# Patient Record
Sex: FEMALE | Employment: OTHER | URBAN - METROPOLITAN AREA
[De-identification: names, ages, dates, MRNs, and addresses within clinical notes are randomized per-mention and may not be internally consistent; named-entity substitution may affect disease eponyms.]

---

## 2018-05-31 ENCOUNTER — ED HISTORICAL/CONVERTED ENCOUNTER (OUTPATIENT)
Dept: OTHER | Age: 53
End: 2018-05-31

## 2024-03-11 SDOH — HEALTH STABILITY: PHYSICAL HEALTH: ON AVERAGE, HOW MANY MINUTES DO YOU ENGAGE IN EXERCISE AT THIS LEVEL?: 40 MIN

## 2024-03-12 ENCOUNTER — HOSPITAL ENCOUNTER (OUTPATIENT)
Facility: HOSPITAL | Age: 59
Discharge: HOME | End: 2024-03-15

## 2024-03-12 ENCOUNTER — OFFICE VISIT (OUTPATIENT)
Age: 59
End: 2024-03-12
Payer: OTHER GOVERNMENT

## 2024-03-12 VITALS — HEIGHT: 62 IN | BODY MASS INDEX: 36.25 KG/M2 | WEIGHT: 197 LBS | RESPIRATION RATE: 14 BRPM

## 2024-03-12 DIAGNOSIS — M99.03 SOMATIC DYSFUNCTION OF LUMBAR REGION: ICD-10-CM

## 2024-03-12 DIAGNOSIS — M99.05 PELVIC REGION SOMATIC DYSFUNCTION: ICD-10-CM

## 2024-03-12 DIAGNOSIS — M51.36 DDD (DEGENERATIVE DISC DISEASE), LUMBAR: ICD-10-CM

## 2024-03-12 DIAGNOSIS — M99.04 SACRAL REGION SOMATIC DYSFUNCTION: ICD-10-CM

## 2024-03-12 DIAGNOSIS — M99.09 SOMATIC DYSFUNCTION OF ABDOMINAL REGION: ICD-10-CM

## 2024-03-12 DIAGNOSIS — M50.30 DDD (DEGENERATIVE DISC DISEASE), CERVICAL: ICD-10-CM

## 2024-03-12 DIAGNOSIS — M99.01 CERVICAL SOMATIC DYSFUNCTION: ICD-10-CM

## 2024-03-12 DIAGNOSIS — M99.06 LOWER LIMB REGION SOMATIC DYSFUNCTION: ICD-10-CM

## 2024-03-12 DIAGNOSIS — M51.36 DDD (DEGENERATIVE DISC DISEASE), LUMBAR: Primary | ICD-10-CM

## 2024-03-12 DIAGNOSIS — M99.08 RIB CAGE REGION SOMATIC DYSFUNCTION: ICD-10-CM

## 2024-03-12 DIAGNOSIS — M99.07 UPPER EXTREMITY SOMATIC DYSFUNCTION: ICD-10-CM

## 2024-03-12 DIAGNOSIS — M99.02 THORACIC REGION SOMATIC DYSFUNCTION: ICD-10-CM

## 2024-03-12 PROCEDURE — 99204 OFFICE O/P NEW MOD 45 MIN: CPT | Performed by: FAMILY MEDICINE

## 2024-03-12 PROCEDURE — 98929 OSTEOPATH MANJ 9-10 REGIONS: CPT | Performed by: FAMILY MEDICINE

## 2024-03-12 NOTE — PROGRESS NOTES
Lumbar facet injections was done by Dr Tapia in 2021, with some relief but the pain has returned  
SPINE (2-3 VIEWS)    Amb External Referral To Physical Therapy    diclofenac (VOLTAREN) 50 MG EC tablet      2. DDD (degenerative disc disease), cervical  XR CERVICAL SPINE (2-3 VIEWS)    Amb External Referral To Physical Therapy    diclofenac (VOLTAREN) 50 MG EC tablet      3. Somatic dysfunction of lumbar region  TN OSTEOPATHIC MANIPULATIVE TX 9-10 BODY REGIONS      4. Pelvic region somatic dysfunction  TN OSTEOPATHIC MANIPULATIVE TX 9-10 BODY REGIONS      5. Sacral region somatic dysfunction  TN OSTEOPATHIC MANIPULATIVE TX 9-10 BODY REGIONS      6. Thoracic region somatic dysfunction  TN OSTEOPATHIC MANIPULATIVE TX 9-10 BODY REGIONS      7. Rib cage region somatic dysfunction  TN OSTEOPATHIC MANIPULATIVE TX 9-10 BODY REGIONS      8. Cervical somatic dysfunction  TN OSTEOPATHIC MANIPULATIVE TX 9-10 BODY REGIONS      9. Lower limb region somatic dysfunction  TN OSTEOPATHIC MANIPULATIVE TX 9-10 BODY REGIONS      10. Upper extremity somatic dysfunction  TN OSTEOPATHIC MANIPULATIVE TX 9-10 BODY REGIONS      11. Somatic dysfunction of abdominal region  TN OSTEOPATHIC MANIPULATIVE TX 9-10 BODY REGIONS        Patient (or guardian if minor) verbalizes understanding of evaluation and plan.    Verbal consent obtained.  Cervical, Thoracic, Rib, Lumbar, Pelvic, Sacral, Upper Ext, Lower Ext, and Abdominal SD treated with Myofascial and ME.  Correction of previous malalignments verified after Tx.    Pt tolerated well.  Notes improvement of Sx and pain is now rated 0/10.    HEP/stretches daily. Discussed stretching/strengthening/posture.    Will start HEP, PT, and Rx for Voltaren 50mg as above and plan follow-up 6 weeks.

## 2024-03-13 ENCOUNTER — TELEPHONE (OUTPATIENT)
Age: 59
End: 2024-03-13

## 2024-03-13 NOTE — TELEPHONE ENCOUNTER
[facility] call from the [facility] today regarding 3/12/24 visit - original referral from August was scheduled with Spine and they needed to confirm patients appointment information.  Patient has been scheduled for a follow up on 5/7/24 and will need authorization for this visit.  Please submit a request for additional visits to [facility] to extend authorization.

## 2024-03-21 ENCOUNTER — TELEPHONE (OUTPATIENT)
Age: 59
End: 2024-03-21

## 2024-03-21 NOTE — TELEPHONE ENCOUNTER
Patient called today after speaking to the [facility] and is asking for a call back to discuss everything they talked about before we submit anything further on her behalf.    Tel. [phone]

## 2024-04-29 ENCOUNTER — TELEPHONE (OUTPATIENT)
Age: 59
End: 2024-04-29

## 2024-04-29 NOTE — TELEPHONE ENCOUNTER
Patient called today to ask if it would be possible to focus on the cervical/upper back pain she is having at her upcoming appt. States she is noticing tingling and numbness that goes down her arms. She is asking if [facility] will treat for it or if he will need to refer her to someone else, especially since she is a [facility] patient and her auth is for Low back pain. She is asking if we need to request for additional service or if she needs to reach out herself, or if he can see her for her upper back/neck.    Please review and advise, [phone]

## 2024-04-30 NOTE — TELEPHONE ENCOUNTER
Contacted pt at her home number discussed her concerns.  Informed her that we would send in the request for additional services for her neck as well as her low back

## 2024-05-07 ENCOUNTER — OFFICE VISIT (OUTPATIENT)
Age: 59
End: 2024-05-07
Payer: OTHER GOVERNMENT

## 2024-05-07 VITALS — WEIGHT: 200 LBS | HEIGHT: 62 IN | BODY MASS INDEX: 36.8 KG/M2

## 2024-05-07 DIAGNOSIS — M99.06 LOWER LIMB REGION SOMATIC DYSFUNCTION: ICD-10-CM

## 2024-05-07 DIAGNOSIS — M99.09 SOMATIC DYSFUNCTION OF ABDOMINAL REGION: ICD-10-CM

## 2024-05-07 DIAGNOSIS — M51.36 DDD (DEGENERATIVE DISC DISEASE), LUMBAR: ICD-10-CM

## 2024-05-07 DIAGNOSIS — M99.02 THORACIC REGION SOMATIC DYSFUNCTION: ICD-10-CM

## 2024-05-07 DIAGNOSIS — M99.01 CERVICAL SOMATIC DYSFUNCTION: ICD-10-CM

## 2024-05-07 DIAGNOSIS — M99.03 SOMATIC DYSFUNCTION OF LUMBAR REGION: ICD-10-CM

## 2024-05-07 DIAGNOSIS — M99.08 RIB CAGE REGION SOMATIC DYSFUNCTION: ICD-10-CM

## 2024-05-07 DIAGNOSIS — M99.05 PELVIC REGION SOMATIC DYSFUNCTION: ICD-10-CM

## 2024-05-07 DIAGNOSIS — M50.30 DDD (DEGENERATIVE DISC DISEASE), CERVICAL: Primary | ICD-10-CM

## 2024-05-07 DIAGNOSIS — M99.07 UPPER EXTREMITY SOMATIC DYSFUNCTION: ICD-10-CM

## 2024-05-07 DIAGNOSIS — M99.04 SACRAL REGION SOMATIC DYSFUNCTION: ICD-10-CM

## 2024-05-07 PROCEDURE — 98929 OSTEOPATH MANJ 9-10 REGIONS: CPT | Performed by: FAMILY MEDICINE

## 2024-05-07 PROCEDURE — 99214 OFFICE O/P EST MOD 30 MIN: CPT | Performed by: FAMILY MEDICINE

## 2024-05-07 NOTE — PROGRESS NOTES
HISTORY OF PRESENT ILLNESS    Angella Herrmann 1965 is a 59 y.o. year old female comes in today to be evaluated and treated for: low back, neck pain - chronic    Since last appt 3/12/2024 has noticed pain is still present and only eval PT as had to complete PT from shoulder surgery, but has the first appt tomorrow for back. Pain level 5/10. Not using Voltaren 50mg. HEP minimal if any.    IMAGING: XR cervical 3/12/2024  IMPRESSION:  1.  Moderate to marked cervical spine degenerative disc disease.    XR lumbar 3/12/2024  IMPRESSION:  1.  Mild lumbar spondylosis.    Past Surgical History:   Procedure Laterality Date    FOOT SURGERY  2019    HEENT      scope to assess vocal cords    ORTHOPEDIC SURGERY  2019    ankle surgery    SHOULDER ARTHROPLASTY      UROLOGICAL SURGERY  08/26/2021    CYSTOSCOPY,LEFT RETROGRADES,LEFT, URETEROSCOPY, left renal stone extraction, POSSIBLE LEFT STENT REMOVAL; Dr. Ozuna    VASCULAR SURGERY      angiogram of neck     Social History     Socioeconomic History    Marital status:      Spouse name: None    Number of children: None    Years of education: None    Highest education level: None   Tobacco Use    Smoking status: Never    Smokeless tobacco: Never   Vaping Use    Vaping Use: Never used   Substance and Sexual Activity    Alcohol use: Not Currently     Comment: During holidays    Drug use: Never    Sexual activity: Yes     Partners: Male     Comment: Occ.      Social Determinants of Health     Physical Activity: Insufficiently Active (3/11/2024)    Exercise Vital Sign     Days of Exercise per Week: 2 days     Minutes of Exercise per Session: 40 min   Intimate Partner Violence: Not At Risk (8/30/2023)    Humiliation, Afraid, Rape, and Kick questionnaire     Fear of Current or Ex-Partner: No     Emotionally Abused: No     Physically Abused: No     Sexually Abused: No     Current Outpatient Medications   Medication Sig Dispense Refill    diclofenac (VOLTAREN) 50 MG EC tablet Take 1

## 2024-05-07 NOTE — PATIENT INSTRUCTIONS
Either scan this QR code on your smartphone:        Or search YouTube for my channel:    Dr. CARYL Oneil    Low back/Piriformis    Neck/Upper back

## 2024-05-08 ENCOUNTER — TELEPHONE (OUTPATIENT)
Age: 59
End: 2024-05-08

## 2024-05-08 NOTE — TELEPHONE ENCOUNTER
Vivienne with [facility] called to state they received our request for services for the patient. However, the RFS must only have the ICD-10 code for one body part (DDD, cervical spine) per RFS and supporting documentation attached in order for it to be accepted for auth. They are requesting we submit a new RFS and the office notes necessary in order for the patient to be seen for her neck and upper back.    Fax: [phone], Team 1

## 2024-08-08 ENCOUNTER — OFFICE VISIT (OUTPATIENT)
Age: 59
End: 2024-08-08
Payer: OTHER GOVERNMENT

## 2024-08-08 VITALS — BODY MASS INDEX: 36.4 KG/M2 | WEIGHT: 199 LBS

## 2024-08-08 DIAGNOSIS — M99.05 PELVIC REGION SOMATIC DYSFUNCTION: ICD-10-CM

## 2024-08-08 DIAGNOSIS — M99.02 THORACIC REGION SOMATIC DYSFUNCTION: ICD-10-CM

## 2024-08-08 DIAGNOSIS — M99.03 SOMATIC DYSFUNCTION OF LUMBAR REGION: ICD-10-CM

## 2024-08-08 DIAGNOSIS — M99.04 SACRAL REGION SOMATIC DYSFUNCTION: ICD-10-CM

## 2024-08-08 DIAGNOSIS — M99.07 UPPER EXTREMITY SOMATIC DYSFUNCTION: ICD-10-CM

## 2024-08-08 DIAGNOSIS — M99.09 SOMATIC DYSFUNCTION OF ABDOMINAL REGION: ICD-10-CM

## 2024-08-08 DIAGNOSIS — M99.06 LOWER LIMB REGION SOMATIC DYSFUNCTION: ICD-10-CM

## 2024-08-08 DIAGNOSIS — M50.30 DDD (DEGENERATIVE DISC DISEASE), CERVICAL: Primary | ICD-10-CM

## 2024-08-08 DIAGNOSIS — M51.36 DDD (DEGENERATIVE DISC DISEASE), LUMBAR: ICD-10-CM

## 2024-08-08 DIAGNOSIS — M99.01 CERVICAL SOMATIC DYSFUNCTION: ICD-10-CM

## 2024-08-08 DIAGNOSIS — M99.08 RIB CAGE REGION SOMATIC DYSFUNCTION: ICD-10-CM

## 2024-08-08 PROCEDURE — 99213 OFFICE O/P EST LOW 20 MIN: CPT | Performed by: FAMILY MEDICINE

## 2024-08-08 PROCEDURE — 98929 OSTEOPATH MANJ 9-10 REGIONS: CPT | Performed by: FAMILY MEDICINE

## 2024-08-08 NOTE — PROGRESS NOTES
HISTORY OF PRESENT ILLNESS    Angella Herrmann 1965 is a 59 y.o. year old female comes in today to be evaluated and treated for: low back, neck pain chronic    Since last appt 5/7/2024 has noticed Sx have improved some with consistent HEP. Pain level 4/10. Using Voltaren 50mg PRN but not needing often with benefit. PT at [place] finished last week.    IMAGING: XR cervical 3/12/2024  IMPRESSION:  1.  Moderate to marked cervical spine degenerative disc disease.     XR lumbar 3/12/2024  IMPRESSION:  1.  Mild lumbar spondylosis.       Past Surgical History:   Procedure Laterality Date    FOOT SURGERY  2019    HEENT      scope to assess vocal cords    ORTHOPEDIC SURGERY  2019    ankle surgery    SHOULDER ARTHROPLASTY      UROLOGICAL SURGERY  08/26/2021    CYSTOSCOPY,LEFT RETROGRADES,LEFT, URETEROSCOPY, left renal stone extraction, POSSIBLE LEFT STENT REMOVAL; Dr. Ozuna    VASCULAR SURGERY      angiogram of neck     Social History     Socioeconomic History    Marital status:      Spouse name: None    Number of children: None    Years of education: None    Highest education level: None   Tobacco Use    Smoking status: Never    Smokeless tobacco: Never   Vaping Use    Vaping Use: Never used   Substance and Sexual Activity    Alcohol use: Not Currently     Comment: During holidays    Drug use: Never    Sexual activity: Yes     Partners: Male     Comment: Occ.      Social Determinants of Health     Physical Activity: Insufficiently Active (3/11/2024)    Exercise Vital Sign     Days of Exercise per Week: 2 days     Minutes of Exercise per Session: 40 min   Intimate Partner Violence: Not At Risk (8/30/2023)    Humiliation, Afraid, Rape, and Kick questionnaire     Fear of Current or Ex-Partner: No     Emotionally Abused: No     Physically Abused: No     Sexually Abused: No     Current Outpatient Medications   Medication Sig Dispense Refill    diclofenac (VOLTAREN) 50 MG EC tablet Take 1 tablet by mouth with breakfast

## 2024-08-08 NOTE — PROGRESS NOTES
AVS reviewed: yes,   HEP: AT reviewed  Resources Provided: yes,  YouTube Videos  Patient questions/concerns answered: yes,   Patient verbalized understanding of treatment plan: yes,

## 2024-08-08 NOTE — PATIENT INSTRUCTIONS
Either scan this QR code on your smartphone:        Or search YouTube for my channel:    Dr. CARYL Oneil    Low back/[place]

## 2024-08-21 ENCOUNTER — TELEPHONE (OUTPATIENT)
Age: 59
End: 2024-08-21

## 2024-08-21 NOTE — TELEPHONE ENCOUNTER
Patient was last seen 8/8/2024 and patient is requesting a call back because she wants to know if Dr Oneil submitted another physical therapy referral for her.    Patient tel [phone]

## 2024-09-03 ENCOUNTER — TELEPHONE (OUTPATIENT)
Age: 59
End: 2024-09-03

## 2024-09-03 DIAGNOSIS — M50.30 DDD (DEGENERATIVE DISC DISEASE), CERVICAL: ICD-10-CM

## 2024-09-03 DIAGNOSIS — M51.36 DDD (DEGENERATIVE DISC DISEASE), LUMBAR: Primary | ICD-10-CM

## 2024-09-03 NOTE — TELEPHONE ENCOUNTER
Pt said she had physical therapy and is still having lower back pain. Pt would like to request for massage therapy for her lower back in 30 minute sessions. Pt states the [facility] will [facility] it and pt is requesting to go to [facility] in [place].     fax: [phone]    [facility] phone#  [phone]

## 2025-01-28 ENCOUNTER — OFFICE VISIT (OUTPATIENT)
Age: 60
End: 2025-01-28
Payer: OTHER GOVERNMENT

## 2025-01-28 VITALS — BODY MASS INDEX: 36.4 KG/M2 | WEIGHT: 199 LBS

## 2025-01-28 DIAGNOSIS — M99.05 PELVIC REGION SOMATIC DYSFUNCTION: ICD-10-CM

## 2025-01-28 DIAGNOSIS — M99.06 LOWER LIMB REGION SOMATIC DYSFUNCTION: ICD-10-CM

## 2025-01-28 DIAGNOSIS — M99.09 SOMATIC DYSFUNCTION OF ABDOMINAL REGION: ICD-10-CM

## 2025-01-28 DIAGNOSIS — M99.03 SOMATIC DYSFUNCTION OF LUMBAR REGION: ICD-10-CM

## 2025-01-28 DIAGNOSIS — M99.07 UPPER EXTREMITY SOMATIC DYSFUNCTION: ICD-10-CM

## 2025-01-28 DIAGNOSIS — M99.01 CERVICAL SOMATIC DYSFUNCTION: ICD-10-CM

## 2025-01-28 DIAGNOSIS — M99.08 RIB CAGE REGION SOMATIC DYSFUNCTION: ICD-10-CM

## 2025-01-28 DIAGNOSIS — M51.362 DEGENERATION OF INTERVERTEBRAL DISC OF LUMBAR REGION WITH DISCOGENIC BACK PAIN AND LOWER EXTREMITY PAIN: Primary | ICD-10-CM

## 2025-01-28 DIAGNOSIS — M50.30 DDD (DEGENERATIVE DISC DISEASE), CERVICAL: ICD-10-CM

## 2025-01-28 DIAGNOSIS — M99.04 SACRAL REGION SOMATIC DYSFUNCTION: ICD-10-CM

## 2025-01-28 DIAGNOSIS — M99.02 THORACIC REGION SOMATIC DYSFUNCTION: ICD-10-CM

## 2025-01-28 DIAGNOSIS — M51.34 DDD (DEGENERATIVE DISC DISEASE), THORACIC: ICD-10-CM

## 2025-01-28 PROCEDURE — 99214 OFFICE O/P EST MOD 30 MIN: CPT | Performed by: FAMILY MEDICINE

## 2025-01-28 PROCEDURE — 98929 OSTEOPATH MANJ 9-10 REGIONS: CPT | Performed by: FAMILY MEDICINE

## 2025-01-28 NOTE — PROGRESS NOTES
HISTORY OF PRESENT ILLNESS    Angella Herrmann 1965 is a 59 y.o. year old female comes in today to be evaluated and treated for: back pain - chronic with flare    Since last appt 8/8/2024 has noticed back Sx worsened since started after walking boot after peroneal surgery [id number]. Pain level 5/10. Using Voltaren 50mg and HEP with benefit prior. Also would like another referral for Massage at [facility] low back and add upper.    IMAGING: XR cervical 3/12/2024  IMPRESSION:  1.  Moderate to marked cervical spine degenerative disc disease.     XR lumbar 3/12/2024  IMPRESSION:  1.  Mild lumbar spondylosis.    Past Surgical History:   Procedure Laterality Date    FOOT SURGERY  2019    HEENT      scope to assess vocal cords    ORTHOPEDIC SURGERY  2019    ankle surgery    SHOULDER ARTHROPLASTY      UROLOGICAL SURGERY  08/26/2021    CYSTOSCOPY,LEFT RETROGRADES,LEFT, URETEROSCOPY, left renal stone extraction, POSSIBLE LEFT STENT REMOVAL; Dr. Ozuna    VASCULAR SURGERY      angiogram of neck     Social History     Socioeconomic History    Marital status:      Spouse name: None    Number of children: None    Years of education: None    Highest education level: None   Tobacco Use    Smoking status: Never    Smokeless tobacco: Never   Vaping Use    Vaping status: Never Used   Substance and Sexual Activity    Alcohol use: Not Currently     Comment: During holidays    Drug use: Never    Sexual activity: Yes     Partners: Male     Comment: Occ.      Social Determinants of Health     Physical Activity: Unknown (3/11/2024)    Exercise Vital Sign     Minutes of Exercise per Session: 40 min   Recent Concern: Physical Activity - Insufficiently Active (3/11/2024)    Exercise Vital Sign     Days of Exercise per Week: 2 days     Minutes of Exercise per Session: 40 min   Intimate Partner Violence: Not At Risk (8/30/2023)    Humiliation, Afraid, Rape, and Kick questionnaire     Fear of Current or Ex-Partner: No     Emotionally Abused:

## 2025-02-04 ENCOUNTER — TELEPHONE (OUTPATIENT)
Age: 60
End: 2025-02-04

## 2025-02-04 DIAGNOSIS — M50.30 DDD (DEGENERATIVE DISC DISEASE), CERVICAL: ICD-10-CM

## 2025-02-04 DIAGNOSIS — M51.362 DEGENERATION OF INTERVERTEBRAL DISC OF LUMBAR REGION WITH DISCOGENIC BACK PAIN AND LOWER EXTREMITY PAIN: Primary | ICD-10-CM

## 2025-02-04 DIAGNOSIS — M51.34 DDD (DEGENERATIVE DISC DISEASE), THORACIC: ICD-10-CM

## 2025-02-04 NOTE — TELEPHONE ENCOUNTER
Patient called in ad wants to know if we can  fax over the referral for Massage Therapy to the [facility]?    Patient would like to have a referral for PT on the Lumbar    Please advise patient @ [phone]

## 2025-02-05 NOTE — TELEPHONE ENCOUNTER
Patient called and is requesting a call back states she had received a call about the above message and have question and is requesting a call back.        [phone]

## 2025-02-05 NOTE — TELEPHONE ENCOUNTER
Spoke to pt to clarify referral information. MT and PT orders were faxed to [facility] as requested by pt yesterday (2/4/25). Pt will follow up if further action is needed.

## 2025-02-06 ENCOUNTER — TELEPHONE (OUTPATIENT)
Age: 60
End: 2025-02-06

## 2025-02-06 NOTE — TELEPHONE ENCOUNTER
Patient called in and stated that she has some new information for Asya    Please advise patient @ [phone]

## 2025-02-25 ENCOUNTER — TELEPHONE (OUTPATIENT)
Age: 60
End: 2025-02-25

## 2025-02-25 NOTE — TELEPHONE ENCOUNTER
Called and informed pt RFS and PT referral have been faxed again to the [facility]    Fax confirmation received

## 2025-02-25 NOTE — TELEPHONE ENCOUNTER
Patient states the [facility] recvd the referral for her (Back Massage) but they haven't  recvd the one for Physical Therapy for Back Pain.    She's asking if she can be called once it has been resent because her back pain is getting worse.    Patient is requesting a call back at [phone].

## 2025-03-11 ENCOUNTER — TELEPHONE (OUTPATIENT)
Age: 60
End: 2025-03-11

## 2025-03-11 NOTE — TELEPHONE ENCOUNTER
Patient called for .     Patient said she has a Bump on her Right side Lower Back.     Patient is asking if  could order her a CT Scan. [facility]    Patient tel. [phone].    Note : patient last seen on 1/28/25.

## 2025-03-13 ENCOUNTER — OFFICE VISIT (OUTPATIENT)
Age: 60
End: 2025-03-13

## 2025-03-13 VITALS — WEIGHT: 199 LBS | BODY MASS INDEX: 36.4 KG/M2

## 2025-03-13 DIAGNOSIS — M51.34 DDD (DEGENERATIVE DISC DISEASE), THORACIC: ICD-10-CM

## 2025-03-13 DIAGNOSIS — M99.01 CERVICAL SOMATIC DYSFUNCTION: ICD-10-CM

## 2025-03-13 DIAGNOSIS — M99.02 THORACIC REGION SOMATIC DYSFUNCTION: ICD-10-CM

## 2025-03-13 DIAGNOSIS — M99.03 SOMATIC DYSFUNCTION OF LUMBAR REGION: ICD-10-CM

## 2025-03-13 DIAGNOSIS — M50.30 DDD (DEGENERATIVE DISC DISEASE), CERVICAL: ICD-10-CM

## 2025-03-13 DIAGNOSIS — M51.362 DEGENERATION OF INTERVERTEBRAL DISC OF LUMBAR REGION WITH DISCOGENIC BACK PAIN AND LOWER EXTREMITY PAIN: Primary | ICD-10-CM

## 2025-03-13 DIAGNOSIS — M99.09 SOMATIC DYSFUNCTION OF ABDOMINAL REGION: ICD-10-CM

## 2025-03-13 DIAGNOSIS — M99.08 RIB CAGE REGION SOMATIC DYSFUNCTION: ICD-10-CM

## 2025-03-13 DIAGNOSIS — M99.05 PELVIC REGION SOMATIC DYSFUNCTION: ICD-10-CM

## 2025-03-13 DIAGNOSIS — M99.04 SACRAL REGION SOMATIC DYSFUNCTION: ICD-10-CM

## 2025-03-13 DIAGNOSIS — M99.06 LOWER LIMB REGION SOMATIC DYSFUNCTION: ICD-10-CM

## 2025-03-13 DIAGNOSIS — M99.07 UPPER EXTREMITY SOMATIC DYSFUNCTION: ICD-10-CM

## 2025-03-13 RX ORDER — DICLOFENAC SODIUM 75 MG/1
75 TABLET, DELAYED RELEASE ORAL 2 TIMES DAILY WITH MEALS
Qty: 60 TABLET | Refills: 1 | Status: SHIPPED | OUTPATIENT
Start: 2025-03-13

## 2025-03-13 NOTE — PROGRESS NOTES
[facility]: Outside name: phenazopyridine (PYRIDIUM) 200 mg tablet      prazosin (MINIPRESS) 2 MG capsule Take 1 capsule by mouth      predniSONE (DELTASONE) 20 MG tablet ceived the following from [facility]: Outside name: predniSONE (DELTASONE) 20 mg tablet      Rimegepant Sulfate (NURTEC) 75 MG TBDP ceived the following from [facility]: Outside name: Nurtec ODT 75 mg disintegrating tablet      topiramate (TOPAMAX) 50 MG tablet take 3 tablets by mouth at bedtime      traZODone (DESYREL) 150 MG tablet ceived the following from [facility]: Outside name: traZODone (DESYREL) 150 mg tablet      valACYclovir (VALTREX) 1 g tablet Take 1 tablet by mouth      VORTIoxetine HBr (TRINTELLIX) 20 MG TABS tablet ceived the following from [facility]: Outside name: Trintellix 20 mg tablet       No current facility-administered medications for this visit.     Past Medical History:   Diagnosis Date    Adverse effect of anesthesia     vocal cord dysfunction    Asthma     GERD with apnea     Kidney stone     Migraines     Sleep apnea     Urinary urgency     Voiding dysfunction      Family History   Problem Relation Age of Onset    Diabetes Mother     Stroke Mother     Parkinson's Disease Father     Thyroid Disease Sister     Diabetes Sister         MS    Thyroid Disease Sister     Diabetes Brother         Diabetes         ROS:  No numb, tingle    Objective:  Wt 90.3 kg (199 lb)   BMI 36.40 kg/m²   NEURO:  Sensation intact to light touch.  Reflexes +2/4 biceps, triceps, patellar, and Achilles bilaterally.  M/S:  Examined seated and supine.  Slump negative. Spurling negative. Standing flexion test positive left  Sphinx test positive left.  ASIS low left  Iliac crests equal bilaterally Pubes equal bilaterally Medial malleolus low left  Sacral base posterior left  ELLEN low left  TTA at C3 on left worse flexion, T2, 7 on right worse flexion, and L5 on left

## 2025-03-13 NOTE — PATIENT INSTRUCTIONS
Either scan this QR code on your smartphone:        Or search YouTube for my channel:    Dr. CRAYL Oneil    Low back/Piriformis    Neck/Upper back

## 2025-06-26 ENCOUNTER — TELEPHONE (OUTPATIENT)
Age: 60
End: 2025-06-26

## 2025-06-26 NOTE — TELEPHONE ENCOUNTER
----- Message from Kamille HENDRICKSON sent at 6/25/2025  4:18 PM EDT -----  Regarding: Specialty Message to Provider  Specialty Message to Provider    Relationship to Patient: Self     Patient Message:   Patient called and is asking if  could order her an mri for her back. That something is really wrong. That she is having problems walking.     Patient would like to have the mri done at [facility]. Patient said that the last time she had this , she had to have a Lumbar Sacet.  Patient said her pain level is a 5 or a 6.    Patient said she has done her physical therapy .   --------------------------------------------------------------------------------------------------------------------------    Call Back Information: OK to leave message on voicemail  Preferred Call Back Number: [phone]

## 2025-06-30 ENCOUNTER — TELEPHONE (OUTPATIENT)
Age: 60
End: 2025-06-30

## 2025-06-30 NOTE — TELEPHONE ENCOUNTER
Spoke to pt regarding Dr. Oneil's response and offered sooner appointment. Pt is not available for the offered appointment and states she is \"not happy and her back is hurting too much to come in\"

## 2025-06-30 NOTE — TELEPHONE ENCOUNTER
----- Message from Dr. Steve Oneil DO sent at 6/30/2025  4:26 PM EDT -----  Regarding: FW: Specialty Message to Provider  She will need an appointment so I can justify the MRI. We can get her in this week if needs be.  ----- Message -----  From: Asya Bean  Sent: 6/30/2025  12:54 PM EDT  To: Steve Oneil DO  Subject: FW: Specialty Message to Provider                  ----- Message -----  From: Shraddha Avilez  Sent: 6/26/2025   4:07 PM EDT  To: [facility] Clinical Staff  Subject: Specialty Message to Provider                    Specialty Message to Provider    Relationship to Patient: Self     Patient Message: patient asked me to type this exactly how she is speaking it.  Given that your next appts is more than 30 days from today's date, would you please put in the MRI  order and then I will see you after the MRI.  --------------------------------------------------------------------------------------------------------------------------    Call Back Information: OK to leave message on voicemail  Preferred Call Back Number: Phone

## 2025-06-30 NOTE — TELEPHONE ENCOUNTER
----- Message from Asya FINNEY sent at 6/30/2025 12:54 PM EDT -----  Regarding: FW: Specialty Message to Provider    ----- Message -----  From: Shraddha Avilez  Sent: 6/26/2025   4:07 PM EDT  To: [facility] Clinical Staff  Subject: Specialty Message to Provider                    Specialty Message to Provider    Relationship to Patient: Self     Patient Message: patient asked me to type this exactly how she is speaking it.  Given that your next appts is more than 30 days from today's date, would you please put in the MRI  order and then I will see you after the MRI.  --------------------------------------------------------------------------------------------------------------------------    Call Back Information: OK to leave message on voicemail  Preferred Call Back Number: Phone